# Patient Record
Sex: FEMALE | HISPANIC OR LATINO
[De-identification: names, ages, dates, MRNs, and addresses within clinical notes are randomized per-mention and may not be internally consistent; named-entity substitution may affect disease eponyms.]

---

## 2021-12-22 PROBLEM — Z00.00 ENCOUNTER FOR PREVENTIVE HEALTH EXAMINATION: Status: ACTIVE | Noted: 2021-12-22

## 2021-12-28 ENCOUNTER — APPOINTMENT (OUTPATIENT)
Dept: OTOLARYNGOLOGY | Facility: CLINIC | Age: 41
End: 2021-12-28
Payer: MEDICAID

## 2021-12-28 VITALS
DIASTOLIC BLOOD PRESSURE: 73 MMHG | WEIGHT: 169 LBS | SYSTOLIC BLOOD PRESSURE: 111 MMHG | HEIGHT: 62 IN | BODY MASS INDEX: 31.1 KG/M2 | HEART RATE: 81 BPM

## 2021-12-28 DIAGNOSIS — J34.2 DEVIATED NASAL SEPTUM: ICD-10-CM

## 2021-12-28 DIAGNOSIS — K21.9 GASTRO-ESOPHAGEAL REFLUX DISEASE W/OUT ESOPHAGITIS: ICD-10-CM

## 2021-12-28 DIAGNOSIS — Z78.9 OTHER SPECIFIED HEALTH STATUS: ICD-10-CM

## 2021-12-28 DIAGNOSIS — R06.02 SHORTNESS OF BREATH: ICD-10-CM

## 2021-12-28 DIAGNOSIS — J31.2 CHRONIC PHARYNGITIS: ICD-10-CM

## 2021-12-28 DIAGNOSIS — J31.0 CHRONIC RHINITIS: ICD-10-CM

## 2021-12-28 DIAGNOSIS — H60.60 UNSPECIFIED CHRONIC OTITIS EXTERNA, UNSPECIFIED EAR: ICD-10-CM

## 2021-12-28 DIAGNOSIS — R05.9 COUGH, UNSPECIFIED: ICD-10-CM

## 2021-12-28 PROCEDURE — 31575 DIAGNOSTIC LARYNGOSCOPY: CPT

## 2021-12-28 PROCEDURE — 99203 OFFICE O/P NEW LOW 30 MIN: CPT | Mod: 25

## 2021-12-28 RX ORDER — BENZONATATE 100 MG/1
100 CAPSULE ORAL
Qty: 30 | Refills: 0 | Status: COMPLETED | COMMUNITY
Start: 2021-10-25

## 2021-12-28 RX ORDER — OMEPRAZOLE 40 MG/1
40 CAPSULE, DELAYED RELEASE ORAL
Qty: 30 | Refills: 0 | Status: ACTIVE | COMMUNITY
Start: 2021-12-11

## 2021-12-28 RX ORDER — FAMOTIDINE 40 MG/1
40 TABLET, FILM COATED ORAL
Qty: 30 | Refills: 0 | Status: ACTIVE | COMMUNITY
Start: 2021-12-11

## 2021-12-28 RX ORDER — AZITHROMYCIN 250 MG/1
250 TABLET, FILM COATED ORAL
Qty: 6 | Refills: 0 | Status: COMPLETED | COMMUNITY
Start: 2021-10-25

## 2021-12-28 RX ORDER — ERGOCALCIFEROL 1.25 MG/1
1.25 MG CAPSULE, LIQUID FILLED ORAL
Qty: 4 | Refills: 0 | Status: ACTIVE | COMMUNITY
Start: 2021-09-10

## 2021-12-28 RX ORDER — CHLORHEXIDINE GLUCONATE, 0.12% ORAL RINSE 1.2 MG/ML
0.12 SOLUTION DENTAL
Qty: 473 | Refills: 0 | Status: ACTIVE | COMMUNITY
Start: 2021-12-11

## 2021-12-29 NOTE — PROCEDURE
[Complicated Symptoms] : complicated symptoms requiring more thorough examination than provided by mirror [de-identified] : Procedure: Flexible Fiberoptic Laryngoscopy: Risks, benefits, and alternatives of flexible endoscopy were explained to the patient. The patient gave oral consent to proceed. The flexible scope was inserted into the right nasal cavity and advanced towards the nasopharynx. Visualized mucosa over the turbinates and septum were as describe above.  The nasopharynx was inflamed with clear mucus. Oropharyngeal walls were symmetric and mobile without lesion, mass, or edema. Hypopharynx was also without lesion or edema. Larynx was mobile without lesions. Edema of post cricoid, arytenoid, and intra arytenoid. Consistent with reflux. True vocal folds were white without mass or lesion. Base of tongue was with lymphoid hyperplasia. No pooling, no lesions or growths. \par . [de-identified] : cough, active reflux sx

## 2021-12-29 NOTE — REVIEW OF SYSTEMS
[Lightheadedness] : lightheadedness [Ear Drainage] : ear drainage [Ear Noises] : ear noises [Throat Pain] : throat pain [Throat Dryness] : throat dryness [Throat Itching] : throat itching [Eye Pain] : eye pain [Shortness Of Breath] : shortness of breath [Cough] : cough [Easy Bruising] : a tendency for easy bruising [Negative] : Heme/Lymph [FreeTextEntry1] : chills, fatigue, daytime sleepiness, sweating at night

## 2021-12-29 NOTE — ASSESSMENT
[FreeTextEntry1] : Reviewed and reconciled medications, allergies, PMHx, PSHx, SocHx, FMHx. \par \par chronic sore throat\par cough\par SOB at rest\par chronic OE\par The nasopharynx was inflamed with clear mucus. \par Edema of post cricoid, arytenoid, and intra arytenoid \par Base of tongue was with lymphoid hyperplasia \par .\par \par Plan:\par Cerumen removed. Flexible Fiberoptic Laryngoscopy. Given reflux diet sheet. Continued with Omeprazole 40mg in the morning, and Pepdcid 40mg before bed. FEEST in 2 weeks. FU after test.

## 2021-12-29 NOTE — PHYSICAL EXAM
[Hearing Caraballo Test (Tuning Fork On Forehead)] : no lateralization of tone [Midline] : trachea located in midline position [Normal] : temporomandibular joint is normal [FreeTextEntry8] : Cerumen removed via curettage.  [FreeTextEntry9] : Cerumen removed via curettage.  [FreeTextEntry1] : mild inflamed turbs. mild deviated septum to the left.  [de-identified] : class 2 [de-identified] : type 2 oral cavity.  slightly elongated uvula  [FreeTextEntry2] : Sinuses nontender to percussion. Sensations intact.

## 2021-12-29 NOTE — ADDENDUM
[FreeTextEntry1] : Documented by Liam Calvert acting as scribe for Dr. Smith on 12/28/2021.\par \par All Medical record entries made by the Scribe were at my, Dr. Smith, direction and personally dictated by me on 12/28/2021. I have reviewed the chart and agree that the record accurately reflects my personal performance of the history, physical exam, assessment and plan. I have also personally directed, reviewed, and agreed with the discharge instructions.\par

## 2021-12-29 NOTE — HISTORY OF PRESENT ILLNESS
[de-identified] : Pt presents today with for further evaluation of chronic sore throat, throat itch which provokes dry cough. Pt also reports feeling SOB during these episodes. Pt saw PCP who never did throat culture or CXR,  just COVID testing which was negative. Pt was put on Z-nitin and Tessalon perles with no improvement. Then was placed on Omeprazole 40mg in the morning and Pepcid 40mg at night, on 12/11/21. Pt wasn't told to follow any reflux diet. Pt started to feel little bit better with cough and never had any issues with swallowing. States that she does feel active reflux sx daily.  Pt reports of only drinking 2 cups of coffee daily. Denies late night eating, or much citrus/tomato products.

## 2021-12-29 NOTE — CONSULT LETTER
[Consult Letter:] : I had the pleasure of evaluating your patient, [unfilled]. [Please see my note below.] : Please see my note below. [Consult Closing:] : Thank you very much for allowing me to participate in the care of this patient.  If you have any questions, please do not hesitate to contact me. [Sincerely,] : Sincerely, [Dear  ___] : Dear  [unfilled], [FreeTextEntry3] : Alexandru Smith MD FACS

## 2022-01-10 ENCOUNTER — NON-APPOINTMENT (OUTPATIENT)
Age: 42
End: 2022-01-10

## 2022-01-24 ENCOUNTER — APPOINTMENT (OUTPATIENT)
Dept: OTOLARYNGOLOGY | Facility: CLINIC | Age: 42
End: 2022-01-24
Payer: MEDICAID

## 2022-01-24 PROCEDURE — 92612 ENDOSCOPY SWALLOW (FEES) VID: CPT | Mod: GN

## 2022-01-24 NOTE — ASSESSMENT
[FreeTextEntry1] : REPORT OF EVALUATION OF SWALLOW FUNCTION VIA FLEXIBLE ENDOSCOPIC EXAMINATION OF SWALLOWING (FEES) \par \par Patient Name: Gissell Mehta\par Patient : 1980\par Medical Diagnosis: Dysphagia \par Treatment Diagnosis: Dysphagia \par Referring Physician: Dr. Alexandru Smith\par Date of onset: 4-5 months ago\par \par History: Information on this patient has been provided by the patient and via chart review. Gissell Mehta was seen for a Flexible Endoscopic Examination of swallowing to: _x__rule out aspiration; _x__assess for diet texture change as appropriate; and/or __x_ explore positional strategies and/or compensatory techniques to eliminate aspiration. \par \par Reason For Referral: To determine patient's ability to safely tolerate an oral diet. \par The physician ordered this procedure because he wants the patient to meet her nutrition/hydration needs by mouth without compromising respiratory status. Mrs. Mehta arrived to today's evaluation with complaints of sore throat and dry cough x4-5 months. Patient also reported a "lump" sensation in throat while pointing to mid-upper neck region, as well as mild changes in vocal quality associated with cough. S/p negative covid tests. Primary physician prescribed antibiotics with no improvement. Patient was then placed on Omeprazole and Pepcid which has helped to reduce cough. Upon further clinician questioning, patient denied dysphagia, odypnophagia, recent history of pneumonia and unintentional weight loss.\par  \par Medical History, as per EMR:\par Active Problems\par Chronic sore throat (472.1) (J31.2)\par Cough (786.2) (R05.9)\par Shortness of breath at rest (786.05) (R06.02)\par \par Past Medical History\par History of No significant past medical history\par \par Surgical History\par No history of surgery\par \par Family History\par No pertinent family history\par \par Social History\par No alcohol use\par Non-smoker (V49.89) (Z78.9)\par \par Allergies\par No Known Drug Allergies\par \par Current Nutritional Intake: Regular Solids and Thin Liquids, as reported by patient\par \par Current Respiratory Status: _x_ Normal ___ Tracheostomy Tube \par \par ASSESSMENT \par Consistencies Administered: \par Solids: _x__ Regular ___Mechanical Soft __x_Puree \par Liquids: _x_ Thin ___ Nectar Thick __Honey Thick \par  _x_ single cup sips _x_ consecutive cup sips\par \par FEES PROCEDURE \par The patient was explained the FEES procedure, and consent was obtained. A nasendoscope was passed via the right nostril without difficulty and positioned above the supraglottic structures. The epiglottis, aryepiglottic folds, arytenoids, and true vocal folds were clearly visible with evidence of mild edema of the post cricoid, arytenoid, and interarytenoid, as consistent with recent ENT findings. Assessment of the TVF abduction and adduction revealed bilateral movement with complete closure during phonation. For the purposes of today's assessment, the patient was provided with puree, regular solid and thin liquid textures. In regard to swallow function, the patient demonstrated adequate tongue base propulsion/retraction, timely pharyngeal swallow trigger, and adequate hyolaryngeal excursion with NO evidence of premature spillage, pharyngeal stasis or laryngeal penetration/aspiration noted before or after the swallow across all textures. Of note, there was trace green-tinged material noted intermittently at the level of the cricopharyngeus / bilateral pyriform sinuses post swallow across all textures, likely indicating laryngopharyngeal reflux (LPR). At this point testing was discontinued and the scope was carefully removed, with patient tolerating the entire procedure without difficulty. \par  \par Aspiration - Penetration Scale: 1 - puree, regular solid, thin liquid\par Aspiration - Penetration Scale (Rosenbek et al Dysphagia 11:93-98 (1996), Aspiration-Penetration Scale) \par 1. Material does not enter the airway \par 2. Material enters the airway, remains above the vocal folds, and is ejected from the airway \par 3. Material enters the airway, remains above the vocal folds, and is not ejected \par 4. Material enters the airway, contacts the vocal folds, and is ejected from the airway \par 5. Material enters the airway, contacts the vocal folds, and is not ejected from the airway \par 6. Material enters the airway, passes below the vocal folds and is ejected into the larynx or out of the airway \par 7. Material enters the airway, passes below the vocal folds, and is not ejected from the trachea despite effort \par 8. Material enters the airway, passes below the vocal folds, and no effort is made to eject \par \par EDUCATION: Evaluation results and recommendations were discussed with the patient. Full understanding was demonstrated.\par \par PROGNOSIS: Good for recommended diet \par \par IMPRESSIONS: Oropharyngeal swallow is deemed WFLs \par \par RECOMMENDATIONS: \par 1) Continue a Regular Solid / Thin Liquid diet\par 2) Reflux Precautions\par 3) Swallow Therapy is not indicated at this time given above findings\par 4) Consider GI consult to rule out an esophageal component given aforementioned findings of edema and possible LPR\par 4) Follow up with referring MD as directed\par \par Should you have additional questions/concerns, please contact this department at [phone].\par \par Dulce Burger M.S., CCC-SLP\par Speech-Language Pathologist\par

## 2022-02-04 ENCOUNTER — APPOINTMENT (OUTPATIENT)
Dept: OTOLARYNGOLOGY | Facility: CLINIC | Age: 42
End: 2022-02-04

## 2023-04-03 ENCOUNTER — TRANSCRIPTION ENCOUNTER (OUTPATIENT)
Age: 43
End: 2023-04-03